# Patient Record
Sex: FEMALE | Race: BLACK OR AFRICAN AMERICAN | NOT HISPANIC OR LATINO | Employment: FULL TIME | URBAN - METROPOLITAN AREA
[De-identification: names, ages, dates, MRNs, and addresses within clinical notes are randomized per-mention and may not be internally consistent; named-entity substitution may affect disease eponyms.]

---

## 2023-08-28 ENCOUNTER — OFFICE VISIT (OUTPATIENT)
Dept: OBSTETRICS AND GYNECOLOGY | Facility: CLINIC | Age: 58
End: 2023-08-28
Payer: COMMERCIAL

## 2023-08-28 VITALS
DIASTOLIC BLOOD PRESSURE: 80 MMHG | WEIGHT: 215.2 LBS | BODY MASS INDEX: 36.74 KG/M2 | HEIGHT: 64 IN | SYSTOLIC BLOOD PRESSURE: 130 MMHG

## 2023-08-28 DIAGNOSIS — N95.0 PMB (POSTMENOPAUSAL BLEEDING): ICD-10-CM

## 2023-08-28 DIAGNOSIS — Z11.3 SCREEN FOR SEXUALLY TRANSMITTED DISEASES: ICD-10-CM

## 2023-08-28 DIAGNOSIS — Z01.419 ENCOUNTER FOR GYNECOLOGICAL EXAMINATION WITHOUT ABNORMAL FINDING: Primary | ICD-10-CM

## 2023-08-28 DIAGNOSIS — Z12.4 SCREENING FOR MALIGNANT NEOPLASM OF CERVIX: ICD-10-CM

## 2023-08-28 DIAGNOSIS — Z12.31 ENCOUNTER FOR SCREENING MAMMOGRAM FOR MALIGNANT NEOPLASM OF BREAST: ICD-10-CM

## 2023-08-28 RX ORDER — OMEPRAZOLE 40 MG/1
40 CAPSULE, DELAYED RELEASE ORAL 2 TIMES DAILY
COMMUNITY
Start: 2023-08-19

## 2023-08-28 RX ORDER — AMLODIPINE BESYLATE 5 MG/1
10 TABLET ORAL DAILY
COMMUNITY
Start: 2014-12-22

## 2023-08-28 RX ORDER — HYDROCHLOROTHIAZIDE 25 MG/1
1 TABLET ORAL DAILY
COMMUNITY
Start: 2023-07-03

## 2023-08-28 RX ORDER — METOCLOPRAMIDE 10 MG/1
TABLET ORAL
COMMUNITY
Start: 2023-07-03

## 2023-08-28 RX ORDER — DICYCLOMINE HCL 20 MG
TABLET ORAL
COMMUNITY
Start: 2023-07-03

## 2023-08-28 NOTE — PROGRESS NOTES
Chief Complaint  New Gyn (Patient is here today to [facility] care and an annual exam. C/O PMB- was really heavy last Friday, spotting now. Stopped having periods in , does have a history of fibroids. )    Subjective        Jeanette Ott presents to [facility]  History of Present Illness  Patient is here for evaluation of postmenopausal bleeding and for regular annual exam.  She has the bleeding began about 9 days ago.  It initially last about 3-4 days and was the volume of the typical menstrual cycle.  She says it stopped for a day and then she has subsequently had light spotting since.  This also happened 1 time ago roughly a year ago.  She says she saw a doctor in [facility] and had an exam and ultrasound performed.  She says they told her that she had fibroids and to just follow-up in a year.  It is unclear if an endometrial biopsy or Pap smear was performed at that time.  There are no records from that visit available today.  We will try to obtain records from that visit.  The patient is sexually active with her .  She says that the bleeding episodes were not preceded by intercourse.  She denies vaginal dryness, irritation, or painful intercourse.  She did report that she had some painful urination about 2 months ago, but she went to an urgent care clinic and was treated for UTI and subsequent this is improved.  She denies leakage of urine.  She denies constipation or bright red blood per rectum.  She denies hematuria.  Patient was a no-show for her appointment today.  Please notify patient of the no-show policy.Patient reports that she is not currently experiencing any symptoms of urinary incontinence.    Patient reports mammogram in May 2023 that was normal.  She reports of colonoscopy within the last 5 years that was normal.    Menstrual History:  OB History          2    Para   2    Term   2            AB        Living   2         SAB        IAB         Ectopic        Molar        Multiple        Live Births   2               Menopause   No LMP recorded. Patient is postmenopausal.     Past Medical History:   Diagnosis Date    Diabetes     controlled with diet and exercise    Fibroids     Hypertension      Past Surgical History:   Procedure Laterality Date     SECTION      CYSTECTOMY Left     breast     Family History   Problem Relation Age of Onset    Breast cancer Neg Hx     Ovarian cancer Neg Hx     Uterine cancer Neg Hx     Colon cancer Neg Hx      Social History     Tobacco Use    Smoking status: Never    Smokeless tobacco: Never   Vaping Use    Vaping Use: Never used   Substance Use Topics    Alcohol use: Yes     Comment: occasionally    Drug use: Never     Current Outpatient Medications on File Prior to Visit   Medication Sig    amLODIPine (NORVASC) 5 MG tablet Take 2 tablets by mouth Daily.    dicyclomine (BENTYL) 20 MG tablet TAKE 1 TABLET BY MOUTH THREE TIMES A DAY AS NEEDED FOR PAIN FOR 90 DAYS    hydroCHLOROthiazide (HYDRODIURIL) 25 MG tablet Take 1 tablet by mouth Daily.    metoclopramide (REGLAN) 10 MG tablet TAKE 1 TABLET BY MOUTH FOUR TIMES A DAY ONE HOUR BEFORE MEALS FOR 30 DAYS    omeprazole (priLOSEC) 40 MG capsule Take 1 capsule by mouth 2 (Two) Times a Day.     No current facility-administered medications on file prior to visit.     No Known Allergies    Review of systems:  Constitutional: No fevers, chills, sweats   Eye: No recent visual problems, denies blurry vision   HEENT: No ear pain, nasal congestion, sore throat, voice changes   Respiratory: No shortness of breath, cough, pain on breathing   Cardiovascular: No Chest pain, palpitations   Gastrointestinal: No nausea, vomiting, diarrhea, constipation   Genitourinary: No hematuria, dysuria, lesions on genitalia   Hema/Lymph: Negative for bruising, no edema   Endocrine: Negative for excessive thirst, excessive hunger, heat or cold intolerance   Musculoskeletal: No joint  "pain, muscle pain, decreased range of motion   Integumentary: No rash, pruritus, abrasions, lesions   Neurologic: No weakness, numbness, headaches   Psychiatric: No anxiety, depression, mood changes         Objective   Vital Signs:  /80   Ht 162.6 cm (64\")   Wt 97.6 kg (215 lb 3.2 oz)   BMI 36.94 kg/mý   Estimated body mass index is 36.94 kg/mý as calculated from the following:    Height as of this encounter: 162.6 cm (64\").    Weight as of this encounter: 97.6 kg (215 lb 3.2 oz).             Physical Exam   Exam performed in the presence of a female chaperone  Patient has provided verbal consent to proceed with exam.    Gen: No acute distress, awake and oriented times three  HENT: Normocephalic, atraumatic, Moist mucous membranes  Eyes: PERRLA, EOMI  Lungs: Normal work of breathing, lungs clear bilaterally  Breast: Symmetrical. No skin changes or nipple retractions. No lumps or masses bilaterally. No tenderness bilaterally.  Abdomen: soft, nontender, no masses or hernia, no hepatosplenomegaly, non distended, normoactive bowel sounds  Normal external female genitalia, no lesions  Urethra: Normal meatus, no caruncle  Bladder: nontender  Vagina: Scant amount of dark red blood noted, no lesions, no white/yellow/purulent discharge  Cervix: No cervical motion tenderness, no lesions, nonfriable, scant amount of blood was noted coming from the cervix  Uterus: Anteverted, normal size and shape, nontender  Adnexa: No masses or tenderness  External anal exam: Normal appearance, no lesions or hemorrhoids  Rectal: Deferred  Skin: Warm and dry, no rashes  Psych: Good judgement and insight, normal affect and mood  Neuro: CN 2-12 intact, no gross deficits    Result Review :                   Assessment and Plan   Diagnoses and all orders for this visit:    1. Encounter for gynecological examination without abnormal finding (Primary)    Pap - Ordered today.  STD screening - Cultures performed today.   Breast cancer " screening. Patient encouraged to perform routine self breast exams. Recommend yearly clinical breast exam and mammogram.  Most recent mammogram was about 3 months ago was normal per patient.  We will attempt to obtain records.  Colonoscopy is up-to-date  Recommend pt take calcium and vitamin D supplementation.  Encourage aerobic exercise with at least 30 minutes 5 days/wk.  Avoid excessive alcohol use.  Patient is advised to call the office for results after 1 week if she has not seen or heard the results of any tests ordered or performed today.    2. Screening for malignant neoplasm of cervix    3. Screen for sexually transmitted diseases    4. PMB (postmenopausal bleeding)  -     IGP,CtNgTv,Apt HPV,rfx 16 / 18,45  -     US Non-ob Transvaginal; Future    We will obtain pelvic ultrasound.  We will also try to obtain outside records to see what work-up was previously done.  Patient will likely need sampling of the endometrium either with attempted office-based endometrial biopsy or hysteroscopy/dilation curettage.  This decision will be made after the ultrasound pending ultrasound results.    5. Encounter for screening mammogram for malignant neoplasm of breast  -     Cancel: Mammo Screening Digital Tomosynthesis Bilateral With CAD; Future             Follow Up   Return GYN US 2-6 weeks and GYN F/U within 7 days, for Obtain Outside Records.  Patient was given instructions and counseling regarding her condition or for health maintenance advice. Please see specific information pulled into the AVS if appropriate.

## 2023-08-30 ENCOUNTER — PATIENT MESSAGE (OUTPATIENT)
Dept: OBSTETRICS AND GYNECOLOGY | Facility: CLINIC | Age: 58
End: 2023-08-30
Payer: COMMERCIAL

## 2023-08-30 ENCOUNTER — PATIENT ROUNDING (BHMG ONLY) (OUTPATIENT)
Dept: OBSTETRICS AND GYNECOLOGY | Facility: CLINIC | Age: 58
End: 2023-08-30
Payer: COMMERCIAL

## 2023-08-30 NOTE — PROGRESS NOTES
My chart message has been sent to the patient for PATIENT ROUNDING with [facility].

## 2023-09-01 LAB
C TRACH RRNA CVX QL NAA+PROBE: NEGATIVE
CYTOLOGIST CVX/VAG CYTO: NORMAL
CYTOLOGY CVX/VAG DOC CYTO: NORMAL
CYTOLOGY CVX/VAG DOC THIN PREP: NORMAL
DX ICD CODE: NORMAL
HIV 1 & 2 AB SER-IMP: NORMAL
HPV GENOTYPE REFLEX: NORMAL
HPV I/H RISK 4 DNA CVX QL PROBE+SIG AMP: NEGATIVE
Lab: NORMAL
N GONORRHOEA RRNA CVX QL NAA+PROBE: NEGATIVE
OTHER STN SPEC: NORMAL
STAT OF ADQ CVX/VAG CYTO-IMP: NORMAL
T VAGINALIS RRNA SPEC QL NAA+PROBE: NEGATIVE

## 2023-09-12 ENCOUNTER — OFFICE VISIT (OUTPATIENT)
Dept: OBSTETRICS AND GYNECOLOGY | Facility: CLINIC | Age: 58
End: 2023-09-12
Payer: COMMERCIAL

## 2023-09-12 VITALS
HEART RATE: 64 BPM | HEIGHT: 64 IN | DIASTOLIC BLOOD PRESSURE: 83 MMHG | WEIGHT: 220 LBS | SYSTOLIC BLOOD PRESSURE: 138 MMHG | BODY MASS INDEX: 37.56 KG/M2

## 2023-09-12 DIAGNOSIS — D25.1 INTRAMURAL AND SUBMUCOUS LEIOMYOMA OF UTERUS: ICD-10-CM

## 2023-09-12 DIAGNOSIS — N95.0 PMB (POSTMENOPAUSAL BLEEDING): ICD-10-CM

## 2023-09-12 DIAGNOSIS — R39.9 UTI SYMPTOMS: Primary | ICD-10-CM

## 2023-09-12 DIAGNOSIS — N30.01 ACUTE CYSTITIS WITH HEMATURIA: ICD-10-CM

## 2023-09-12 DIAGNOSIS — D25.0 INTRAMURAL AND SUBMUCOUS LEIOMYOMA OF UTERUS: ICD-10-CM

## 2023-09-12 LAB
BILIRUB BLD-MCNC: NEGATIVE MG/DL
CLARITY, POC: CLEAR
COLOR UR: YELLOW
GLUCOSE UR STRIP-MCNC: NEGATIVE MG/DL
KETONES UR QL: ABNORMAL
LEUKOCYTE EST, POC: ABNORMAL
NITRITE UR-MCNC: NEGATIVE MG/ML
PH UR: 5.5 [PH] (ref 5–8)
PROT UR STRIP-MCNC: NEGATIVE MG/DL
RBC # UR STRIP: ABNORMAL /UL
SP GR UR: 1.03 (ref 1–1.03)
UROBILINOGEN UR QL: ABNORMAL

## 2023-09-12 RX ORDER — NITROFURANTOIN 25; 75 MG/1; MG/1
100 CAPSULE ORAL 2 TIMES DAILY
Qty: 6 CAPSULE | Refills: 0 | Status: SHIPPED | OUTPATIENT
Start: 2023-09-12

## 2023-09-12 NOTE — PROGRESS NOTES
Procedure: Endometrial biopsy  Preoperative diagnosis: Postmenopausal bleeding  Postoperative diagnosis: Same  Anesthesia: None  Pathology: Endometrial biopsy  Estimated blood loss: Less than 5 mL  Indications: Patient with an episode of postmenopausal bleeding.  Ultrasound showed endometrial lining is 0.56 cm possible submucosal fibroid.  Bleeding had resolved by the time that she came in for the endometrial biopsy.  Procedure in detail:  Patient counseled about the indications to the procedure. Risks, benefits, and alternatives discussed with the patient.  Verbal consent was obtained.  Office-based urine pregnancy test is not indicated due to age.  [place] speculum placed in the vagina.  Betadine applied to the cervix.  Single-toothed tenaculum was applied to the anterior lip of the cervix.  The external cervical os was somewhat stenotic.  This was gently dilated with an os finder.  The uterus was then sounded to 7 cm with the endometrial biopsy [place].  The plunger was deployed, and a 4 quadrant specimen was obtained.  A mild to moderate amount of tissue was obtained.  This was sent to pathology.  The patient tolerated the procedure well.  There were no complications.  The single tooth tenaculum was removed from the anterior lip of the cervix.  Pressure was held with a swab and silver nitrate was applied.  Excellent hemostasis was noted.  The patient will be notified of the results within a week.  She is instructed to call our office if she fara not heard anything about results in 1-2 weeks.

## 2023-09-12 NOTE — PROGRESS NOTES
"Chief Complaint  Gynecologic Exam (Patient is here for a follow up on pmb- u/s done today. Bleeding has stopped. C/O burning sensation after urinating. )    Subjective        Jeanette Ott presents to [facility]  History of Present Illness  Patient is here for follow-up of postmenopausal bleeding.  She reports that she has not had any further episodes of bleeding since time of her last visit.  She is reporting some intermittent episodes of dysuria and urinary frequency.  She denies bright red blood in her urine.  She denies fevers and chills.  She denies lower abdominal pain.    The following portions of the patient's history were reviewed and updated as appropriate: allergies, current medications, past family history, past medical history, past social history, past surgical history, and problem list.    Objective   Vital Signs:  /83   Pulse 64   Ht 162.6 cm (64\")   Wt 99.8 kg (220 lb)   BMI 37.76 kg/m²   Estimated body mass index is 37.76 kg/m² as calculated from the following:    Height as of this encounter: 162.6 cm (64\").    Weight as of this encounter: 99.8 kg (220 lb).             Physical Exam   Gen: No acute distress, awake and oriented times three  Abdomen: soft, nontender, no masses or hernia, no hepatosplenomegaly, non distended, normoactive bowel sounds  Pelvic: Exam performed in the presence of a female chaperone  Patient has provided verbal consent to proceed with exam.  Normal external female genitalia, no lesions  Urethra: Normal meatus, no caruncle  Bladder: nontender  Vagina: No blood or discharge  Cervix: No cervical motion tenderness, no lesions, no active bleeding, nonfriable  Uterus: Anteverted, normal size and shape, nontender  Adnexa: No masses or tenderness  External anal exam: Normal appearance, no lesions or hemorrhoids  Rectal: Deferred  Psych: Good judgement and insight, normal affect and mood    Result Review :          Office Visit on 09/12/2023 "   Component Date Value Ref Range Status    Color 09/12/2023 Yellow  Yellow, Straw, Dark Yellow, [place] Final    Clarity, UA 09/12/2023 Clear  Clear Final    Glucose, UA 09/12/2023 Negative  Negative mg/dL Final    Bilirubin 09/12/2023 Negative  Negative Final    Ketones, UA 09/12/2023 Trace (A)  Negative Final    Specific Gravity  09/12/2023 1.030  1.005 - 1.030 Final    Blood, UA 09/12/2023 Trace (A)  Negative Final    pH, Urine 09/12/2023 5.5  5.0 - 8.0 Final    Protein, POC 09/12/2023 Negative  Negative mg/dL Final    Urobilinogen, UA 09/12/2023 0.2 E.U./dL  Normal, 0.2 E.U./dL Final    Leukocytes 09/12/2023 Trace (A)  Negative Final    Nitrite, UA 09/12/2023 Negative  Negative Final   Office Visit on 08/28/2023   Component Date Value Ref Range Status    Diagnosis 08/28/2023 Comment   Final    Comment: NEGATIVE FOR INTRAEPITHELIAL LESION OR MALIGNANCY.  CELLULAR CHANGES ASSOCIATED WITH ATROPHY ARE PRESENT.  THIS SPECIMEN WAS RESCREENED AS PART OF OUR  PROGRAM.      Specimen adequacy: 08/28/2023 Comment   Final    Comment: Satisfactory for evaluation.  Endocervical and/or squamous metaplastic  cells (endocervical component) are present.      Clinician Provided ICD-10: 08/28/2023 Comment   Final    N95.0    Performed by: 08/28/2023 Comment   Final    Juan Ramon You, Cytotechnologist (ASCP)    QC reviewed by: 08/28/2023 Comment   Final    Hannah Cast, Supervisory Cytotechnologist (ASCP)    . 08/28/2023 .   Final    Note: 08/28/2023 Comment   Final    Comment: The Pap smear is a screening test designed to aid in the detection of  premalignant and malignant conditions of the uterine cervix.  It is not a  diagnostic procedure and should not be used as the sole means of detecting  cervical cancer.  Both false-positive and false-negative reports do occur.      Method: 08/28/2023 Comment   Final    Comment: This liquid based ThinPrep(R) pap test was screened with the  use of an image guided  system.      HPV Aptima 08/28/2023 Negative  Negative Final    Comment: This nucleic acid amplification test detects fourteen high-risk  HPV types (16,18,31,33,35,39,45,51,52,56,58,59,66,68) without  differentiation.      HPV Genotype Reflex 08/28/2023 Comment   Final    Criteria not met, HPV Genotype not performed.    Chlamydia, Nuc. Acid Amp 08/28/2023 Negative  Negative Final    Gonococcus by Nucleic Acid Amp 08/28/2023 Negative  Negative Final    Trichomonas vaginosis 08/28/2023 Negative  Negative Final     Ultrasound today:  Findings:  Uterus measures 6.48 x 5.75 x 4.37 cm, volume 85.256 cm³  There are 2 fibroids identified.  Fibroid 1 appears to be fundal and measures 2.56 x 2.37 cm  Fibroid 2 appears to either be submucosal or intramural but impinging on the endometrial lining, and measures 2.41 x 1.88 cm  Endometrial thickness: 0.56 cm and with a very scant amount of endometrial fluid noted    Left ovary: 2.04 x 1.74 x 1.57 cm, volume 2.918 cm³    Right ovary: 1.97 x 1.59 x 1.42 cm, volume 2.329 cm³    There are no adnexal masses or free fluid    Impression:    Uterus with 2 small fibroids noted, 1 of which may be submucosal.  Endometrial thickness 0.56 cm, slightly thickened for postmenopausal woman  Ovaries normal-appearing         Assessment and Plan   Diagnoses and all orders for this visit:    1. UTI symptoms (Primary)  -     POC Urinalysis Dipstick    2. PMB (postmenopausal bleeding)  -     Reference Histopathology  -     Endometrial Biopsy    Postmenopausal bleeding may be related to fibroids.  Her bleeding has stopped at this time.  I do recommend endometrial biopsy for exclusion of endometrial hyperplasia or cancer given endometrial thickness greater than 4 mm.  Endometrial biopsy was performed today without difficulty.  Please see separate procedure note for details.  If the endometrial biopsy is negative and she has no further episodes of bleeding, I do not feel that any further work-up or  management is necessary.  If she continues to have significant episodes of postmenopausal bleeding, may need to consider hysteroscopic myomectomy.  Patient verbalized understanding and agrees with the plan.    3. Intramural and submucous leiomyoma of uterus    4. Acute cystitis with hematuria  -     Urine Culture - Urine, Urine, Clean Catch  -     nitrofurantoin, macrocrystal-monohydrate, (Macrobid) 100 MG capsule; Take 1 capsule by mouth 2 (Two) Times a Day.  Dispense: 6 capsule; Refill: 0    Patient's office urinalysis and symptoms appear to be consistent with UTI.  We will treat empirically with Macrobid.         Follow Up   Return if symptoms worsen or fail to improve.  Patient was given instructions and counseling regarding her condition or for health maintenance advice. Please see specific information pulled into the AVS if appropriate.

## 2023-09-14 LAB
BACTERIA UR CULT: NORMAL
BACTERIA UR CULT: NORMAL

## 2023-09-15 LAB
DX ICD CODE: NORMAL
PATH REPORT.FINAL DX SPEC: NORMAL
PATH REPORT.GROSS SPEC: NORMAL
PATH REPORT.SITE OF ORIGIN SPEC: NORMAL
PATHOLOGIST NAME: NORMAL
PAYMENT PROCEDURE: NORMAL